# Patient Record
Sex: FEMALE | Race: OTHER | HISPANIC OR LATINO | Employment: UNEMPLOYED | URBAN - METROPOLITAN AREA
[De-identification: names, ages, dates, MRNs, and addresses within clinical notes are randomized per-mention and may not be internally consistent; named-entity substitution may affect disease eponyms.]

---

## 2023-04-17 ENCOUNTER — HOSPITAL ENCOUNTER (INPATIENT)
Facility: HOSPITAL | Age: 23
LOS: 2 days | Discharge: HOME | End: 2023-04-19
Attending: SPECIALIST | Admitting: SPECIALIST

## 2023-04-17 DIAGNOSIS — N85.8 ALTERATION IN COMFORT ASSOCIATED WITH UTERINE CONTRACTIONS: ICD-10-CM

## 2023-04-17 DIAGNOSIS — R58 HEMORRHAGE: Primary | ICD-10-CM

## 2023-04-17 DIAGNOSIS — O47.9 UTERINE CONTRACTIONS: ICD-10-CM

## 2023-04-17 LAB
ABO + RH BLD: NORMAL
ALBUMIN SERPL BCP-MCNC: 3.3 G/DL (ref 3.5–5.2)
ALP SERPL-CCNC: 321 U/L (ref 55–135)
ALT SERPL W/O P-5'-P-CCNC: 10 U/L (ref 10–44)
AMPHET+METHAMPHET UR QL: NEGATIVE
ANION GAP SERPL CALC-SCNC: 11 MMOL/L (ref 8–16)
AST SERPL-CCNC: 24 U/L (ref 10–40)
BACTERIA #/AREA URNS HPF: NEGATIVE /HPF
BARBITURATES UR QL SCN>200 NG/ML: NEGATIVE
BASOPHILS # BLD AUTO: 0.02 K/UL (ref 0–0.2)
BASOPHILS NFR BLD: 0.3 % (ref 0–1.9)
BENZODIAZ UR QL SCN>200 NG/ML: NEGATIVE
BILIRUB SERPL-MCNC: 0.5 MG/DL (ref 0.1–1)
BILIRUB UR QL STRIP: NEGATIVE
BLD GP AB SCN CELLS X3 SERPL QL: NORMAL
BUN SERPL-MCNC: 10 MG/DL (ref 6–20)
BZE UR QL SCN: NEGATIVE
CALCIUM SERPL-MCNC: 8.4 MG/DL (ref 8.7–10.5)
CANNABINOIDS UR QL SCN: NEGATIVE
CHLORIDE SERPL-SCNC: 103 MMOL/L (ref 95–110)
CLARITY UR: ABNORMAL
CO2 SERPL-SCNC: 20 MMOL/L (ref 23–29)
COLOR UR: ABNORMAL
CREAT SERPL-MCNC: 0.6 MG/DL (ref 0.5–1.4)
CREAT UR-MCNC: 24 MG/DL (ref 15–325)
DIFFERENTIAL METHOD: ABNORMAL
EOSINOPHIL # BLD AUTO: 0.1 K/UL (ref 0–0.5)
EOSINOPHIL NFR BLD: 0.9 % (ref 0–8)
ERYTHROCYTE [DISTWIDTH] IN BLOOD BY AUTOMATED COUNT: 15.1 % (ref 11.5–14.5)
EST. GFR  (NO RACE VARIABLE): >60 ML/MIN/1.73 M^2
GLUCOSE SERPL-MCNC: 86 MG/DL (ref 70–110)
GLUCOSE UR QL STRIP: NEGATIVE
HCT VFR BLD AUTO: 38.1 % (ref 37–48.5)
HGB BLD-MCNC: 12.9 G/DL (ref 12–16)
HGB UR QL STRIP: ABNORMAL
HIV1+2 IGG SERPL QL IA.RAPID: NEGATIVE
HYALINE CASTS #/AREA URNS LPF: 1 /LPF
IMM GRANULOCYTES # BLD AUTO: 0.03 K/UL (ref 0–0.04)
IMM GRANULOCYTES NFR BLD AUTO: 0.4 % (ref 0–0.5)
KETONES UR QL STRIP: ABNORMAL
LEUKOCYTE ESTERASE UR QL STRIP: NEGATIVE
LYMPHOCYTES # BLD AUTO: 1.9 K/UL (ref 1–4.8)
LYMPHOCYTES NFR BLD: 23.5 % (ref 18–48)
MCH RBC QN AUTO: 29.6 PG (ref 27–31)
MCHC RBC AUTO-ENTMCNC: 33.9 G/DL (ref 32–36)
MCV RBC AUTO: 87 FL (ref 82–98)
MICROSCOPIC COMMENT: ABNORMAL
MONOCYTES # BLD AUTO: 0.4 K/UL (ref 0.3–1)
MONOCYTES NFR BLD: 5.3 % (ref 4–15)
NEUTROPHILS # BLD AUTO: 5.5 K/UL (ref 1.8–7.7)
NEUTROPHILS NFR BLD: 69.6 % (ref 38–73)
NITRITE UR QL STRIP: NEGATIVE
NRBC BLD-RTO: 0 /100 WBC
OPIATES UR QL SCN: NEGATIVE
PCP UR QL SCN>25 NG/ML: NEGATIVE
PH UR STRIP: 8 [PH] (ref 5–8)
PLATELET # BLD AUTO: 208 K/UL (ref 150–450)
PMV BLD AUTO: 12.1 FL (ref 9.2–12.9)
POTASSIUM SERPL-SCNC: 3.5 MMOL/L (ref 3.5–5.1)
PROT SERPL-MCNC: 7 G/DL (ref 6–8.4)
PROT UR QL STRIP: ABNORMAL
RBC # BLD AUTO: 4.36 M/UL (ref 4–5.4)
RBC #/AREA URNS HPF: >100 /HPF (ref 0–4)
RPR SER QL: NORMAL
SODIUM SERPL-SCNC: 134 MMOL/L (ref 136–145)
SP GR UR STRIP: 1.01 (ref 1–1.03)
SQUAMOUS #/AREA URNS HPF: 1 /HPF
TOXICOLOGY INFORMATION: NORMAL
URN SPEC COLLECT METH UR: ABNORMAL
UROBILINOGEN UR STRIP-ACNC: NEGATIVE EU/DL
WBC # BLD AUTO: 7.86 K/UL (ref 3.9–12.7)
WBC #/AREA URNS HPF: 7 /HPF (ref 0–5)

## 2023-04-17 PROCEDURE — 80053 COMPREHEN METABOLIC PANEL: CPT | Performed by: SPECIALIST

## 2023-04-17 PROCEDURE — 86900 BLOOD TYPING SEROLOGIC ABO: CPT | Performed by: SPECIALIST

## 2023-04-17 PROCEDURE — A4216 STERILE WATER/SALINE, 10 ML: HCPCS | Performed by: SPECIALIST

## 2023-04-17 PROCEDURE — 80307 DRUG TEST PRSMV CHEM ANLYZR: CPT | Performed by: SPECIALIST

## 2023-04-17 PROCEDURE — 25000003 PHARM REV CODE 250: Performed by: SPECIALIST

## 2023-04-17 PROCEDURE — 86592 SYPHILIS TEST NON-TREP QUAL: CPT | Performed by: SPECIALIST

## 2023-04-17 PROCEDURE — 87340 HEPATITIS B SURFACE AG IA: CPT | Performed by: SPECIALIST

## 2023-04-17 PROCEDURE — 86703 HIV-1/HIV-2 1 RESULT ANTBDY: CPT | Performed by: SPECIALIST

## 2023-04-17 PROCEDURE — 87389 HIV-1 AG W/HIV-1&-2 AB AG IA: CPT | Performed by: SPECIALIST

## 2023-04-17 PROCEDURE — 72200004 HC VAGINAL DELIVERY LEVEL I

## 2023-04-17 PROCEDURE — 72200005 HC VAGINAL DELIVERY LEVEL II

## 2023-04-17 PROCEDURE — 12000002 HC ACUTE/MED SURGE SEMI-PRIVATE ROOM

## 2023-04-17 PROCEDURE — 36415 COLL VENOUS BLD VENIPUNCTURE: CPT | Performed by: SPECIALIST

## 2023-04-17 PROCEDURE — 81001 URINALYSIS AUTO W/SCOPE: CPT | Performed by: SPECIALIST

## 2023-04-17 PROCEDURE — 85025 COMPLETE CBC W/AUTO DIFF WBC: CPT | Performed by: SPECIALIST

## 2023-04-17 PROCEDURE — 63600175 PHARM REV CODE 636 W HCPCS: Performed by: SPECIALIST

## 2023-04-17 RX ORDER — PROCHLORPERAZINE EDISYLATE 5 MG/ML
5 INJECTION INTRAMUSCULAR; INTRAVENOUS EVERY 6 HOURS PRN
Status: DISCONTINUED | OUTPATIENT
Start: 2023-04-17 | End: 2023-04-17

## 2023-04-17 RX ORDER — SIMETHICONE 80 MG
1 TABLET,CHEWABLE ORAL 4 TIMES DAILY PRN
Status: DISCONTINUED | OUTPATIENT
Start: 2023-04-17 | End: 2023-04-17

## 2023-04-17 RX ORDER — CALCIUM CARBONATE 200(500)MG
500 TABLET,CHEWABLE ORAL 3 TIMES DAILY PRN
Status: DISCONTINUED | OUTPATIENT
Start: 2023-04-17 | End: 2023-04-17

## 2023-04-17 RX ORDER — OXYTOCIN-SODIUM CHLORIDE 0.9% IV SOLN 30 UNIT/500ML 30-0.9/5 UT/ML-%
30 SOLUTION INTRAVENOUS ONCE AS NEEDED
Status: DISCONTINUED | OUTPATIENT
Start: 2023-04-17 | End: 2023-04-17

## 2023-04-17 RX ORDER — METHYLERGONOVINE MALEATE 0.2 MG/ML
200 INJECTION INTRAVENOUS
Status: DISCONTINUED | OUTPATIENT
Start: 2023-04-17 | End: 2023-04-19 | Stop reason: HOSPADM

## 2023-04-17 RX ORDER — OXYTOCIN-SODIUM CHLORIDE 0.9% IV SOLN 30 UNIT/500ML 30-0.9/5 UT/ML-%
30 SOLUTION INTRAVENOUS ONCE AS NEEDED
Status: DISCONTINUED | OUTPATIENT
Start: 2023-04-17 | End: 2023-04-19 | Stop reason: HOSPADM

## 2023-04-17 RX ORDER — HYDROCORTISONE 25 MG/G
CREAM TOPICAL 3 TIMES DAILY PRN
Status: DISCONTINUED | OUTPATIENT
Start: 2023-04-17 | End: 2023-04-19 | Stop reason: HOSPADM

## 2023-04-17 RX ORDER — MUPIROCIN 20 MG/G
OINTMENT TOPICAL
Status: DISCONTINUED | OUTPATIENT
Start: 2023-04-17 | End: 2023-04-19 | Stop reason: HOSPADM

## 2023-04-17 RX ORDER — MISOPROSTOL 200 UG/1
800 TABLET ORAL ONCE AS NEEDED
Status: DISCONTINUED | OUTPATIENT
Start: 2023-04-17 | End: 2023-04-19 | Stop reason: HOSPADM

## 2023-04-17 RX ORDER — MISOPROSTOL 200 UG/1
800 TABLET ORAL
Status: DISCONTINUED | OUTPATIENT
Start: 2023-04-17 | End: 2023-04-19 | Stop reason: HOSPADM

## 2023-04-17 RX ORDER — PRENATAL WITH FERROUS FUM AND FOLIC ACID 3080; 920; 120; 400; 22; 1.84; 3; 20; 10; 1; 12; 200; 27; 25; 2 [IU]/1; [IU]/1; MG/1; [IU]/1; MG/1; MG/1; MG/1; MG/1; MG/1; MG/1; UG/1; MG/1; MG/1; MG/1; MG/1
1 TABLET ORAL DAILY
Status: DISCONTINUED | OUTPATIENT
Start: 2023-04-17 | End: 2023-04-19 | Stop reason: HOSPADM

## 2023-04-17 RX ORDER — TRANEXAMIC ACID 10 MG/ML
1000 INJECTION, SOLUTION INTRAVENOUS ONCE AS NEEDED
Status: DISCONTINUED | OUTPATIENT
Start: 2023-04-17 | End: 2023-04-17

## 2023-04-17 RX ORDER — AMOXICILLIN 250 MG
1 CAPSULE ORAL 2 TIMES DAILY PRN
Status: DISCONTINUED | OUTPATIENT
Start: 2023-04-17 | End: 2023-04-19 | Stop reason: HOSPADM

## 2023-04-17 RX ORDER — METHYLERGONOVINE MALEATE 0.2 MG/ML
200 INJECTION INTRAVENOUS
Status: DISCONTINUED | OUTPATIENT
Start: 2023-04-17 | End: 2023-04-17

## 2023-04-17 RX ORDER — CARBOPROST TROMETHAMINE 250 UG/ML
250 INJECTION, SOLUTION INTRAMUSCULAR
Status: DISCONTINUED | OUTPATIENT
Start: 2023-04-17 | End: 2023-04-19 | Stop reason: HOSPADM

## 2023-04-17 RX ORDER — TRANEXAMIC ACID 10 MG/ML
1000 INJECTION, SOLUTION INTRAVENOUS ONCE AS NEEDED
Status: DISCONTINUED | OUTPATIENT
Start: 2023-04-17 | End: 2023-04-19 | Stop reason: HOSPADM

## 2023-04-17 RX ORDER — ONDANSETRON 4 MG/1
8 TABLET, ORALLY DISINTEGRATING ORAL EVERY 8 HOURS PRN
Status: DISCONTINUED | OUTPATIENT
Start: 2023-04-17 | End: 2023-04-17

## 2023-04-17 RX ORDER — SODIUM CHLORIDE 0.9 % (FLUSH) 0.9 %
3 SYRINGE (ML) INJECTION EVERY 8 HOURS
Status: DISCONTINUED | OUTPATIENT
Start: 2023-04-17 | End: 2023-04-18

## 2023-04-17 RX ORDER — CEFAZOLIN SODIUM 2 G/50ML
2 SOLUTION INTRAVENOUS
Status: DISCONTINUED | OUTPATIENT
Start: 2023-04-17 | End: 2023-04-17

## 2023-04-17 RX ORDER — DOCUSATE SODIUM 100 MG/1
200 CAPSULE, LIQUID FILLED ORAL 2 TIMES DAILY PRN
Status: DISCONTINUED | OUTPATIENT
Start: 2023-04-17 | End: 2023-04-19 | Stop reason: HOSPADM

## 2023-04-17 RX ORDER — SIMETHICONE 80 MG
1 TABLET,CHEWABLE ORAL EVERY 6 HOURS PRN
Status: DISCONTINUED | OUTPATIENT
Start: 2023-04-17 | End: 2023-04-19 | Stop reason: HOSPADM

## 2023-04-17 RX ORDER — MISOPROSTOL 200 UG/1
800 TABLET ORAL ONCE AS NEEDED
Status: DISCONTINUED | OUTPATIENT
Start: 2023-04-17 | End: 2023-04-17

## 2023-04-17 RX ORDER — ONDANSETRON 4 MG/1
8 TABLET, ORALLY DISINTEGRATING ORAL EVERY 8 HOURS PRN
Status: DISCONTINUED | OUTPATIENT
Start: 2023-04-17 | End: 2023-04-19 | Stop reason: HOSPADM

## 2023-04-17 RX ORDER — PROCHLORPERAZINE EDISYLATE 5 MG/ML
5 INJECTION INTRAMUSCULAR; INTRAVENOUS EVERY 6 HOURS PRN
Status: DISCONTINUED | OUTPATIENT
Start: 2023-04-17 | End: 2023-04-19 | Stop reason: HOSPADM

## 2023-04-17 RX ORDER — ACETAMINOPHEN 325 MG/1
650 TABLET ORAL EVERY 6 HOURS PRN
Status: DISCONTINUED | OUTPATIENT
Start: 2023-04-17 | End: 2023-04-19 | Stop reason: HOSPADM

## 2023-04-17 RX ORDER — OXYTOCIN-SODIUM CHLORIDE 0.9% IV SOLN 30 UNIT/500ML 30-0.9/5 UT/ML-%
0-30 SOLUTION INTRAVENOUS CONTINUOUS
Status: DISCONTINUED | OUTPATIENT
Start: 2023-04-17 | End: 2023-04-17

## 2023-04-17 RX ORDER — CARBOPROST TROMETHAMINE 250 UG/ML
250 INJECTION, SOLUTION INTRAMUSCULAR
Status: DISCONTINUED | OUTPATIENT
Start: 2023-04-17 | End: 2023-04-17

## 2023-04-17 RX ORDER — SODIUM CHLORIDE, SODIUM LACTATE, POTASSIUM CHLORIDE, CALCIUM CHLORIDE 600; 310; 30; 20 MG/100ML; MG/100ML; MG/100ML; MG/100ML
INJECTION, SOLUTION INTRAVENOUS CONTINUOUS
Status: DISCONTINUED | OUTPATIENT
Start: 2023-04-17 | End: 2023-04-17

## 2023-04-17 RX ORDER — OXYTOCIN-SODIUM CHLORIDE 0.9% IV SOLN 30 UNIT/500ML 30-0.9/5 UT/ML-%
30 SOLUTION INTRAVENOUS ONCE
Status: DISCONTINUED | OUTPATIENT
Start: 2023-04-17 | End: 2023-04-19 | Stop reason: HOSPADM

## 2023-04-17 RX ORDER — OXYCODONE AND ACETAMINOPHEN 5; 325 MG/1; MG/1
1 TABLET ORAL EVERY 4 HOURS PRN
Status: DISCONTINUED | OUTPATIENT
Start: 2023-04-17 | End: 2023-04-19 | Stop reason: HOSPADM

## 2023-04-17 RX ORDER — OXYCODONE AND ACETAMINOPHEN 10; 325 MG/1; MG/1
1 TABLET ORAL EVERY 4 HOURS PRN
Status: DISCONTINUED | OUTPATIENT
Start: 2023-04-17 | End: 2023-04-19 | Stop reason: HOSPADM

## 2023-04-17 RX ORDER — LIDOCAINE HYDROCHLORIDE 10 MG/ML
10 INJECTION INFILTRATION; PERINEURAL ONCE AS NEEDED
Status: DISCONTINUED | OUTPATIENT
Start: 2023-04-17 | End: 2023-04-17

## 2023-04-17 RX ADMIN — CEFAZOLIN SODIUM 2 G: 2 SOLUTION INTRAVENOUS at 03:04

## 2023-04-17 RX ADMIN — SODIUM CHLORIDE, PRESERVATIVE FREE 3 ML: 5 INJECTION INTRAVENOUS at 02:04

## 2023-04-17 RX ADMIN — IBUPROFEN 600 MG: 400 TABLET ORAL at 02:04

## 2023-04-17 RX ADMIN — SODIUM CHLORIDE, SODIUM LACTATE, POTASSIUM CHLORIDE, AND CALCIUM CHLORIDE 1000 ML: .6; .31; .03; .02 INJECTION, SOLUTION INTRAVENOUS at 02:04

## 2023-04-17 RX ADMIN — PRENATAL VIT W/ FE FUMARATE-FA TAB 27-0.8 MG 1 TABLET: 27-0.8 TAB at 10:04

## 2023-04-17 RX ADMIN — IBUPROFEN 600 MG: 400 TABLET ORAL at 07:04

## 2023-04-17 RX ADMIN — IBUPROFEN 600 MG: 400 TABLET ORAL at 08:04

## 2023-04-17 RX ADMIN — BENZOCAINE AND LEVOMENTHOL: 200; 5 SPRAY TOPICAL at 10:04

## 2023-04-17 NOTE — SUBJECTIVE & OBJECTIVE
Obstetric HPI:  22-year-old  1 para 0 at 34 weeks and 6 days gestational age by admit ultrasound without prenatal care presents in spontaneous active labor with spontaneous rupture membranes.  Conflicting information with patient stating she did have ultrasound in [place] which gave her a due date of April 10, 2023.    Spontaneous rupture membranes at approximately 2:00 a.m. on 2023, active fetal movement    OB History    Para Term  AB Living   1 0 0 0 0 0   SAB IAB Ectopic Multiple Live Births   0 0 0 0 0      # Outcome Date GA Lbr Dustin/2nd Weight Sex Delivery Anes PTL Lv   1 Current              No past medical history on file.  No past surgical history on file.    No medications prior to admission.       Review of patient's allergies indicates:  No Known Allergies     Family History    None       Tobacco Use    Smoking status: Not on file    Smokeless tobacco: Not on file   Substance and Sexual Activity    Alcohol use: Not on file    Drug use: Not on file    Sexual activity: Not on file     Review of Systems   Objective:     Vital Signs (Most Recent):    Vital Signs (24h Range):        Weight: 49.9 kg (110 lb)  Body mass index is 20.78 kg/m².    FHT:  Baseline 130s with good variability, occasional early appearing fetal heart rate deceleration   TOCO:  Q 2-4 minutes    Physical Exam  General-uncomfortable with contractions, abdomen/uterus-gravid nontender  Extremities-no calf tenderness  Cervix:  Dilation:  6.5 cm  Effacement:  80%  Station: -2  Presentation: Vertex     Significant Labs:  No results found for: GROUPTRH, HEPBSAG, RUBELLAIGGSC, STREPBCULT, AFP, [id number]    CBC:   Recent Labs   Lab 23  0243   WBC 7.86   RBC 4.36   HGB 12.9   HCT 38.1      MCV 87   MCH 29.6   MCHC 33.9     I have personallly reviewed all pertinent lab results from the last 24 hours.

## 2023-04-17 NOTE — L&D DELIVERY NOTE
"[facility]  Vaginal Delivery   Operative Note    SUMMARY     Normal spontaneous vaginal delivery of live infant, was placed on mothers abdomen for skin to skin and bulb suctioning performed.  No shoulder dystocia.  Infant delivered position OA over perineum.  Nuchal cord: No.    Spontaneous delivery of placenta and IV pitocin given noting good uterine tone.  Uterine sweep reveals no retained products of conception.  2nd degree laceration noted and repaired in normal fashion with 2-0 chromic, rectovaginal exam within normal limits. .  Patient tolerated delivery well. Sponge needle and lap counted correctly x2.    Indications: Alteration in comfort associated with uterine contractions  Pregnancy complicated by:   Patient Active Problem List   Diagnosis    Alteration in comfort associated with uterine contractions     Admitting GA: 34w6d    Viable female infant with Apgar scores 8/9, weight 5 lb 7 oz     cc    Delivery Information for Girl Glory Myers    Birth information:  YOB: 2021   Time of birth: 5:02 AM   Sex: female   Head Delivery Date/Time: 2023  5:02 AM   Delivery type: Vaginal, Spontaneous   Gestational Age: 34w6d    Delivery Providers    Delivering clinician: Ronald Martínez MD   Provider Role    Yasmin Paige, RN Delivery Nurse    Loc Aleman, CST Scrub Person    Katja Velez, MARTIN Delivery Assist    Jennyfer Strong, MARTIN Delivery Assist    Florencia Talamantes, MARTIN Delivery Assist              Measurements    Weight: 2479 g  Weight (lbs): 5 lb 7.4 oz  Length: 47 cm  Length (in): 18.5"         Apgars    Living status: Living  Apgars:  1 min.:  5 min.:  10 min.:  15 min.:  20 min.:    Skin color:  0  1       Heart rate:  2  2       Reflex irritability:  2  2       Muscle tone:  2  2       Respiratory effort:  2  2       Total:  8  9       Apgars assigned by: ASHLEY SALAZAR         Operative Delivery    Forceps attempted?: No  Vacuum extractor attempted?: No   "       Shoulder Dystocia    Shoulder dystocia present?: No           Presentation    Presentation: Vertex  Position: Left Occiput Anterior           Interventions/Resuscitation    Method: Bulb Suctioning, Tactile Stimulation       Cord    Vessels: 3 vessels  Complications: None  Delayed Cord Clamping?: Yes  Cord Clamped Date/Time: 2023  5:03 AM  Cord Blood Disposition: Sent with Baby  Gases Sent?: No       Placenta    Placenta delivery date/time: 2023 0504  Placenta removal: Spontaneous  Placenta appearance: Intact  Placenta disposition: Discarded           Labor Events:       labor: Yes     Labor Onset Date/Time: 2023 02:00     Dilation Complete Date/Time: 2023 04:45     Start Pushing Date/Time: 2023 05:03       Start Pushing Date/Time: 2023 05:03     Rupture Date/Time: 23  0200           Rupture type: SRM (Spontaneous Rupture)           Fluid Amount:         Fluid Color: Clear                 steroids: None     Antibiotics given for GBS: Yes     Induction:       Indications for induction:        Augmentation:       Indications for augmentation:       Labor complications: None     Additional complications:          Cervical ripening:                     Delivery:      Episiotomy:       Indication for Episiotomy:       Perineal Lacerations:   Repaired:      Periurethral Laceration:   Repaired:     Labial Laceration:   Repaired:     Sulcus Laceration:   Repaired:     Vaginal Laceration:   Repaired:     Cervical Laceration:   Repaired:     Repair suture:       Repair # of packets:       Last Value - EBL - Nursing (mL):       Sum - EBL - Nursing (mL): 0     Last Value - EBL - Anesthesia (mL):        Calculated QBL (mL):         Vaginal Sweep Performed:       Surgicount Correct:         Other providers:       Anesthesia    Method: Local          Details (if applicable):  Trial of Labor      Categorization:      Priority:     Indications  for :     Incision Type:       Additional  information:  Forceps:    Vacuum:    Breech:    Observed anomalies    Other (Comments):

## 2023-04-17 NOTE — NURSING TRANSFER
Nursing Transfer Note      4/17/2023     Reason patient is being transferred: delivered    Transfer To: 2106    Transfer via wheelchair    Transfer with belongings    Transported by Meghan Lomas RNC    Medicines sent: none    Any special needs or follow-up needed: none    Chart send with patient: Yes    Notified: spouse    Patient reassessed at: 4/17/23 0815    Upon arrival to floor: patient oriented to room, call bell in reach, and bed in lowest position    Report to Marycruz Killian RN

## 2023-04-17 NOTE — ASSESSMENT & PLAN NOTE
IUP at 34 weeks and 6 days gestational age by admit ultrasound, possibly 41 weeks  [place], last visit with OB in [place] in January 2023   Spontaneous labor with spontaneous rupture membranes-making cervical change   Unknown GBS status-on IV antibiotics   Drop-in labs   Patient declines epidural or IV pain medication  Ugandan-speaking only

## 2023-04-17 NOTE — H&P
[facility]  Obstetrics  History & Physical    Patient Name: Glory Myers  MRN: [id number]  Admission Date: 2023  Primary Care Provider: No primary care provider on file.    Subjective:     Principal Problem:Alteration in comfort associated with uterine contractions    History of Present Illness:  No notes on file    Obstetric HPI:  22-year-old  1 para 0 at 34 weeks and 6 days gestational age by admit ultrasound without prenatal care presents in spontaneous active labor with spontaneous rupture membranes.  Conflicting information with patient stating she did have ultrasound in [place] which gave her a due date of April 10, 2023.    Spontaneous rupture membranes at approximately 2:00 a.m. on 2023, active fetal movement    OB History    Para Term  AB Living   1 0 0 0 0 0   SAB IAB Ectopic Multiple Live Births   0 0 0 0 0      # Outcome Date GA Lbr Dustin/2nd Weight Sex Delivery Anes PTL Lv   1 Current              No past medical history on file.  No past surgical history on file.    No medications prior to admission.       Review of patient's allergies indicates:  No Known Allergies     Family History    None       Tobacco Use    Smoking status: Not on file    Smokeless tobacco: Not on file   Substance and Sexual Activity    Alcohol use: Not on file    Drug use: Not on file    Sexual activity: Not on file     Review of Systems   Objective:     Vital Signs (Most Recent):    Vital Signs (24h Range):        Weight: 49.9 kg (110 lb)  Body mass index is 20.78 kg/m².    FHT:  Baseline 130s with good variability, occasional early appearing fetal heart rate deceleration   TOCO:  Q 2-4 minutes    Physical Exam  General-uncomfortable with contractions, abdomen/uterus-gravid nontender  Extremities-no calf tenderness  Cervix:  Dilation:  6.5 cm  Effacement:  80%  Station: -2  Presentation: Vertex     Significant Labs:  No results found for: GROUPTRH, HEPBSAG, RUBELLAIGGSC,  STREPBCULT, AFP, [id number]    CBC:   Recent Labs   Lab 23  0243   WBC 7.86   RBC 4.36   HGB 12.9   HCT 38.1      MCV 87   MCH 29.6   MCHC 33.9     I have personallly reviewed all pertinent lab results from the last 24 hours.    Assessment/Plan:     22 y.o. female  at 34w6d for:    * Alteration in comfort associated with uterine contractions  IUP at 34 weeks and 6 days gestational age by admit ultrasound, possibly 41 weeks  [place], last visit with OB in [place] in 2023   Spontaneous labor with spontaneous rupture membranes-making cervical change   Unknown GBS status-on IV antibiotics   Drop-in labs   Patient declines epidural or IV pain medication  South [place]ese-speaking only        Gege Martínez MD  Obstetrics  [facility]

## 2023-04-17 NOTE — CARE UPDATE
GREG asked Rebecca (Medicaid Representative) to review patient for coverage.    SW sent text to [facility] to inform of need, SW awaiting response. (Typically, they assist with cribs,car seats,bottles,diapers, etc).

## 2023-04-17 NOTE — HOSPITAL COURSE
22-year-old  1 para 0 at 34 weeks and 6 days gestational age by admit ultrasound without prenatal care presents in spontaneous active labor with spontaneous rupture membranes.  Conflicting information with patient stating she did have ultrasound in [place] which gave her a due date of April 10, 2023.

## 2023-04-17 NOTE — LACTATION NOTE
This note was copied from a baby's chart.     04/17/23 6110   Maternal Assessment   Breast Size Issue none   Breast Shape round   Breast Density soft   Areola elastic   Nipples everted   Maternal Infant Feeding   Maternal Emotional State assist needed   Infant Positioning side-lying   Signs of Milk Transfer audible swallow   Pain with Feeding no   Latch Assistance yes     Sami speaking only. Michelle interpretor [place] [id number]. Assisted with position & latch. Baby latched on well but has uncoordinated suck & swallow. Discussed feeding cues & feeding frequency 8 or more times in 24 hours. Instructed mom to always offer breast 1st prior to supplementing with formula. Discussed supply & demand. Sami booklet reviewed with mom. Assistance offered prn. Mom verbalized understanding

## 2023-04-18 LAB
BASOPHILS # BLD AUTO: 0.02 K/UL (ref 0–0.2)
BASOPHILS NFR BLD: 0.3 % (ref 0–1.9)
DIFFERENTIAL METHOD: ABNORMAL
EOSINOPHIL # BLD AUTO: 0.1 K/UL (ref 0–0.5)
EOSINOPHIL NFR BLD: 0.9 % (ref 0–8)
ERYTHROCYTE [DISTWIDTH] IN BLOOD BY AUTOMATED COUNT: 15.4 % (ref 11.5–14.5)
HBV SURFACE AG SERPL QL IA: NEGATIVE
HCT VFR BLD AUTO: 32.9 % (ref 37–48.5)
HGB BLD-MCNC: 10.9 G/DL (ref 12–16)
HIV 1+2 AB+HIV1 P24 AG SERPL QL IA: NON REACTIVE
IMM GRANULOCYTES # BLD AUTO: 0.02 K/UL (ref 0–0.04)
IMM GRANULOCYTES NFR BLD AUTO: 0.3 % (ref 0–0.5)
LYMPHOCYTES # BLD AUTO: 2.4 K/UL (ref 1–4.8)
LYMPHOCYTES NFR BLD: 30.3 % (ref 18–48)
MCH RBC QN AUTO: 29.8 PG (ref 27–31)
MCHC RBC AUTO-ENTMCNC: 33.1 G/DL (ref 32–36)
MCV RBC AUTO: 90 FL (ref 82–98)
MONOCYTES # BLD AUTO: 0.5 K/UL (ref 0.3–1)
MONOCYTES NFR BLD: 6.6 % (ref 4–15)
NEUTROPHILS # BLD AUTO: 4.9 K/UL (ref 1.8–7.7)
NEUTROPHILS NFR BLD: 61.6 % (ref 38–73)
NRBC BLD-RTO: 0 /100 WBC
PLATELET # BLD AUTO: 173 K/UL (ref 150–450)
PMV BLD AUTO: 11.6 FL (ref 9.2–12.9)
RBC # BLD AUTO: 3.66 M/UL (ref 4–5.4)
WBC # BLD AUTO: 7.93 K/UL (ref 3.9–12.7)

## 2023-04-18 PROCEDURE — 36415 COLL VENOUS BLD VENIPUNCTURE: CPT | Performed by: SPECIALIST

## 2023-04-18 PROCEDURE — 12000002 HC ACUTE/MED SURGE SEMI-PRIVATE ROOM

## 2023-04-18 PROCEDURE — 85025 COMPLETE CBC W/AUTO DIFF WBC: CPT | Performed by: SPECIALIST

## 2023-04-18 PROCEDURE — 25000003 PHARM REV CODE 250: Performed by: SPECIALIST

## 2023-04-18 RX ADMIN — OXYCODONE HYDROCHLORIDE AND ACETAMINOPHEN 1 TABLET: 10; 325 TABLET ORAL at 07:04

## 2023-04-18 RX ADMIN — PRENATAL VIT W/ FE FUMARATE-FA TAB 27-0.8 MG 1 TABLET: 27-0.8 TAB at 08:04

## 2023-04-18 RX ADMIN — OXYCODONE HYDROCHLORIDE AND ACETAMINOPHEN 1 TABLET: 10; 325 TABLET ORAL at 11:04

## 2023-04-18 RX ADMIN — IBUPROFEN 600 MG: 400 TABLET ORAL at 07:04

## 2023-04-18 NOTE — NURSING
Kellen  used for morning assessment. ID [id number]. Nurse addressed concerns of needing a car seat for baby to complete the car seat challenge before discharge. Patients spouse at bedside explained that his aunt will bring it tomorrow. Nurse also explained that we will need pediatrician information to complete the PKU form. Spouse explained that his aunt will send the information. Nurse explained to call on the call light when they receive information. Both patient and spouse verbalized understanding. Patient educated on safe sleep with baby. Nurse explained that baby should be on her back in the crib with only a swaddle blanket and no thick fluffy blanket. Patient verbalized understanding. No questions or concerns at this time. Nurse helped latch baby on right breast before leaving the room. Patient instructed to call on call light for any assistance. Patient and spouse verbalized understanding.

## 2023-04-18 NOTE — CARE UPDATE
Rebecca (Medicaid Rep) informed SW that she made attempt to contact pt by number on file and pt phone in room, no answer. Rebecca suggested SW provide pt with FAA, sw explained pt may not understand form or how to fill form out. GREG also explained that number on file is patient aunt number. Rebecca will make another attempt to contact patient by hospital phone.

## 2023-04-18 NOTE — SUBJECTIVE & OBJECTIVE
Interval History:  Postpartum day 1.-no complaints    She is doing well this morning. She is tolerating a regular diet without nausea or vomiting. She is voiding spontaneously. She is ambulating. She has passed flatus, and has not a BM. Vaginal bleeding is mild. She denies fever or chills. Abdominal pain is mild and controlled with oral medications.    Objective:     Vital Signs (Most Recent):  Temp: 98.5 °F (36.9 °C) (04/18/23 1109)  Pulse: 64 (04/18/23 1109)  Resp: 17 (04/18/23 1109)  BP: (!) 110/59 (04/18/23 1109)  SpO2: 98 % (04/18/23 1109)   Vital Signs (24h Range):  Temp:  [98.1 °F (36.7 °C)-98.5 °F (36.9 °C)] 98.5 °F (36.9 °C)  Pulse:  [47-64] 64  Resp:  [16-18] 17  SpO2:  [97 %-99 %] 98 %  BP: (108-121)/(59-74) 110/59     Weight: 49.9 kg (110 lb)  Body mass index is 20.78 kg/m².    No intake or output data in the 24 hours ending 04/18/23 1242      Significant Labs:  Lab Results   Component Value Date    GROUPTRH O POS 04/17/2023    HEPBSAG Negative 04/17/2023     Recent Labs   Lab 04/18/23  0505   HGB 10.9*   HCT 32.9*       CBC:   Recent Labs   Lab 04/18/23  0505   WBC 7.93   RBC 3.66*   HGB 10.9*   HCT 32.9*      MCV 90   MCH 29.8   MCHC 33.1     I have personallly reviewed all pertinent lab results from the last 24 hours.    Physical Exam  General-no apparent distress resting comfortably in bed   Uterus-firm below the umbilicus  Lochia-minimal  Extremities-no calf tenderness  Review of Systems

## 2023-04-18 NOTE — NURSING
Call light answered by nurse. Justin  used at bedside. Spouse showed nurse phone the aunt in [place] ordered bus tickets from the [employer] station in [place] to [facility] for 8 am.     Nurse contacted both OB and Peds    OB gave ok to discharge patient in the AM     Peds ordered a weight and TCB on baby in the morning nurse to report results to Ped MD to consider order at 7am.     House sup called to get cab in order to bring family to bus station per . Edmar sup did not answer. Will continue to call to have arrangements for AM.

## 2023-04-18 NOTE — PLAN OF CARE
No acute events over night. AWA used to complete patient assessment. VSS. Bleeding is light. Patient medicated according to pain level. Pain controlled with oral pain medication. Patient and significant other educated on the importance of safe sleep with infant using . Patient and significant other verbalized understanding. Patient denies any questions or concerns at this time.      Problem: Adult Inpatient Plan of Care  Goal: Optimal Comfort and Wellbeing  Outcome: Ongoing, Progressing     Problem:  Fall Injury Risk  Goal: Absence of Fall, Infant Drop and Related Injury  Outcome: Ongoing, Progressing     Problem: Infection  Goal: Absence of Infection Signs and Symptoms  Outcome: Ongoing, Progressing     Problem: Adult Inpatient Plan of Care  Goal: Patient-Specific Goal (Individualized)  Outcome: Ongoing, Progressing

## 2023-04-18 NOTE — PLAN OF CARE
JULIA [id number]  Assessment completed: at bedside with mother,  father also present     Address mother and baby will discharge home to: [place] address on file to live with aunt     History of Substance Abuse issues: Mother denies     Assistive Treatment Programs or Medications: mother denies     History of Mental Health issues: mother denies     History of Domestic Violence: mother denies     Name: Tika Coellomez    GREG conducted a full assessment at bedside with mother due to a consult request for drop in from [place] and no prenatal care. SW asked if there was a reason mother had no prenatal care, mother explained she was waiting in a migrant [place] for about 3 months in order to get to the [place] SW asked mother the plan to get baby to and from medical appointments, both parents stated their aunt. SW asked will aunt come to hospital, father stated yes. There is no pediatrician information available, parents are waiting on aunt to arrive from [place] with that information. Parents do not have any items for baby (car seat, crib, clothes, bottles, or diapers.) SW coordinated with [facility] on 23, who will assist with items. SW educated mother and father on WIC and provided them with a [facility] brochure, translated into French. Family support is the aunt. SW asked mother if she had any questions or concerns, mother stated no. SW asked mother if she had any resource needs, mother stated she has everything and there is no need for resource. Mother has no further needs at this time. White board in room updated with contact information, and mother was encouraged to contact office if further needs arise.       23 1033   OB Discharge Planning Assessment   Assessment Type Discharge Planning Assessment   Source of Information patient;health record; utilized   Verified Demographic and Insurance Information   (sent secure chat to Rebecca for review)   People in Home other  relative(s);significant other   Name(s) of People in Home Ana Gonzalez (aunt) [phone], Joseph Yusuf (significant other)   Number people in home 3 including baby   Relationship Status In relationship   Name of Support/Comfort Primary Source Ana Gonzalez (aunt) [phone]   Currently Enrolled in School No   Father's Involvement Fully Involved   Is Father signing the birth certificate Yes   Father's Address Joseph Yusuf same address as mom   Father's Employer father plans to begin work once in [place]   Family Involvement Minimal  (aunt only form of support)   Received Prenatal Care No  (Pt stated she and s/o were in migrant camps and could not recieve prenatal care)   Transportation Anticipated family or friend will provide   Receive WIC Benefits Not certified, will apply for     Arrangements Self;Family   Adoption Planned no   Infant Feeding Plan breastfeeding;formula feeding   Previous Breastfeeding Experience no   Breast Pump Needed no   Does baby have crib or safe sleep space? No   Plans to obtain crib by discharge Plans to obtain by discharge   Do you have a car seat? No   Provided resources to obtain Provided resources to obtain   Has other essential care items? None   Pediatrician aunt selected a pediatrician and pt nor s/o has that information. Aunt is to provide information later today or tomorrow upon arrival   Resource/Environmental Concerns none   Equipment Currently Used at Home none   Potential Discharge Needs None   Resources/Education Provided WIC   DME Needed Upon Discharge  none   DCFS No indications (Indicators for Report)   Discharge Plan A Home;Home with family   Discharge Plan B Home;Home with family   Do you have any problems affording any of your prescribed medications? No

## 2023-04-18 NOTE — CARE UPDATE
SW met at bedside with bassinet, car seat, and other items from crisis center. SW utilized [employer] to ask parents about aunt because she was not answering the phone. SW asked if parents can call aunt, father called a number that is disconnected. (The number that was provided to SW Is not a disconnected line). Parents stated aunt is at work at that is why she is not answering. SW asked parents if aunt will pick them up tomorrow morning, parents stated yes. SW informed parents that she will come back shortly so that they can try aunt again.     During the conversation, SW noticed baby under cover in bed with mom. SW educated parents on safe sleep. Mother stated baby is breast feeding, SW asked for cover to be removed from over baby head. SW also asked that baby is placed in crib once finish feeding. SW informed assigned RN.     SW informed parents that medicaid representative have been trying to contact her to provide health care coverage, mother stated she is trying to get in contact with a family member who can understand representative (even though representative is using a  service).     GREG coordinated with  Director about discharge plan for family if aunt does not arrive tomorrow. Director informed SW to search for shelters in [place] and [facility] can provide a taxi there.

## 2023-04-18 NOTE — PROGRESS NOTES
[facility]  Obstetrics  Postpartum Progress Note    Patient Name: Glory Ding  MRN: [id number]  Admission Date: 2023  Hospital Length of Stay: 1 days  Attending Physician: Ronald Martínez MD  Primary Care Provider: Primary Doctor No    Subjective:     Principal Problem:Alteration in comfort associated with uterine contractions    Hospital Course:  22-year-old  1 para 0 at 34 weeks and 6 days gestational age by admit ultrasound without prenatal care presents in spontaneous active labor with spontaneous rupture membranes.  Conflicting information with patient stating she did have ultrasound in [place] which gave her a due date of April 10, 2023.      Interval History:  Postpartum day 1.-no complaints    She is doing well this morning. She is tolerating a regular diet without nausea or vomiting. She is voiding spontaneously. She is ambulating. She has passed flatus, and has not a BM. Vaginal bleeding is mild. She denies fever or chills. Abdominal pain is mild and controlled with oral medications.    Objective:     Vital Signs (Most Recent):  Temp: 98.5 °F (36.9 °C) (23 1109)  Pulse: 64 (23 1109)  Resp: 17 (23 1109)  BP: (!) 110/59 (23 1109)  SpO2: 98 % (23 1109)   Vital Signs (24h Range):  Temp:  [98.1 °F (36.7 °C)-98.5 °F (36.9 °C)] 98.5 °F (36.9 °C)  Pulse:  [47-64] 64  Resp:  [16-18] 17  SpO2:  [97 %-99 %] 98 %  BP: (108-121)/(59-74) 110/59     Weight: 49.9 kg (110 lb)  Body mass index is 20.78 kg/m².    No intake or output data in the 24 hours ending 23 1242      Significant Labs:  Lab Results   Component Value Date    GROUPTRH O POS 2023    HEPBSAG Negative 2023     Recent Labs   Lab 23  0505   HGB 10.9*   HCT 32.9*       CBC:   Recent Labs   Lab 23  0505   WBC 7.93   RBC 3.66*   HGB 10.9*   HCT 32.9*      MCV 90   MCH 29.8   MCHC 33.1     I have personallly reviewed all pertinent lab results from the last 24  hours.    Physical Exam  General-no apparent distress resting comfortably in bed   Uterus-firm below the umbilicus  Lochia-minimal  Extremities-no calf tenderness  Review of Systems    Assessment/Plan:     22 y.o. female  for:    * Alteration in comfort associated with uterine contractions  Ppd 1.-status post -doing well   discharge tomorrow        Disposition: As patient meets milestones, will plan to discharge .    Gege Martínez MD  Obstetrics  [facility]

## 2023-04-18 NOTE — LACTATION NOTE
Spoke to parents via language line interpretor # [id number]. Patient lying in bed with baby , baby's body and face completely covered with blanket. Removed blanket and advised patient, via interpretor, not to cover baby's face and to place baby in crib on her back when sleeping. Patient states that baby is breastfeeding and then supplementing with formula. States that baby last fed at 1100 for 3 minutes on left breast, then took 50 ml of formula. States that nipples hurt during breastfeeding. Reviewed basic breastfeeding instructions and emphasized importance of deep latch and putting baby to breast at least 8 times in 24 hours to stimulate adequate milk production. Instructed to put baby to breast and stimulate during feeding to keep baby nursing for longer periods, before offering formula. Notified that I will be back to assist with next feeding and to call nurses station if lactation assistance needed in the meantime. Denies any breastfeeding related questions or concerns and verbalizes understanding of all instructions with good recall, via interpretor.

## 2023-04-18 NOTE — CARE UPDATE
SW and RN met at bedside with patient. After RN discussed  with baby. SW asked parents if they had talked with their aunt. Father stated yes, aunt car is now damaged and she cannot get here until Friday. Mother and baby will be ready to discharge tomorrow. SW asked if aunt can purchase bus tickets to [place]. Father called aunt who stated she will purchase tickets. SW explained to parents that if aunt cannot purchase tickets, the hospital will purchase a cab to get family to homeless shelter. SW made it clear that it is possible father will be  from mother due to most homeless shelters not allowing both genders. SW asked parents to notify RN once tickets are purchased.    [facility] (has a men side and a women side)  Address: [address] [place], [place] [address] [place] [address]  Hours:   Open 24 hours  Phone: [phone]    [facility] [place] [address]  P [phone]  F [phone]

## 2023-04-19 VITALS
SYSTOLIC BLOOD PRESSURE: 107 MMHG | OXYGEN SATURATION: 100 % | BODY MASS INDEX: 20.77 KG/M2 | DIASTOLIC BLOOD PRESSURE: 66 MMHG | WEIGHT: 110 LBS | HEIGHT: 61 IN | HEART RATE: 71 BPM | RESPIRATION RATE: 18 BRPM | TEMPERATURE: 98 F

## 2023-04-19 PROCEDURE — 25000003 PHARM REV CODE 250: Performed by: SPECIALIST

## 2023-04-19 RX ADMIN — OXYCODONE HYDROCHLORIDE AND ACETAMINOPHEN 1 TABLET: 10; 325 TABLET ORAL at 03:04

## 2023-04-19 RX ADMIN — IBUPROFEN 600 MG: 400 TABLET ORAL at 03:04

## 2023-04-19 NOTE — CARE UPDATE
JULIA utilized to complete discharge education and instructions. Follow up appointment instructions discussed, patient verbalized understanding. All questions answered at this time. Patient given [facility] hospital phone number to call with any questions or concerns. Patient provided with printed copy of discharge information.

## 2023-04-19 NOTE — NURSING
Call placed to the nursing supervisor about making arrangements for taxi service at patient discharge. Mary provided phone number to [employer] cab service to make arrangements for early transport. I called and spoke with dispatch and was instructed to call back at 0530 to speak to someone about transportation services.

## 2023-04-19 NOTE — CARE UPDATE
Patient in no apparent distress. VSS. Ambulating without difficulty. No needs at this time. Discharge papers signed. Mother Baby care guide reviewed. All questions answered. Escorted to front entrance of hospital, cab picked up patient and family for escort to bus station. Mom d/c in stable condition with even and unlabored respirations.

## 2023-04-19 NOTE — DISCHARGE INSTRUCTIONS
"Hacer un seguimiento con tomlinson médico en 2 semanas o antes para cualquier problema.    [facility] nithya 6 semanas (sin sexo, tampones, douching, nada en la vagina)   Usted puede experimentar sangrado vaginal dentro y fuera de hasta 6 semanas, poco a poco se volverá más neema y el color cambiará de grijalva brillante a torrie secreción marrón hacia el final.  Actividad:   NO hay actividades extenuantes o hacer ejercicio nithya 6 semanas. No recoja/levante nada de más de 15 libras y no haya tareas domésticas pesadas o limpieza nithya 6 semanas. Limite la escalada de escaleras a dos veces al día nithya las primeras 2 semanas.   NO conducir nithya 4 semanas. Puede hacer viajes cortos en coche, anastasia no conducir.   Usted puede ducharse SOLAMENTE nithya las primeras 2 semanas, después de 2 semanas puede empaparse en torrie bañera. Use un jabón suave, sin perfumes pesados ni fragancias para evitar la irritación.   Caminar con frecuencia después de un parto por cesárea promueve la curación y disminuye el dolor asociado con el gas.   Si se desarrolla estreñimiento: Puede rishabh Colace (ablandador de heces), Leche de Magnesia, Dulcolax o Miralax. Todos estos medicamentos se venden sin receta.   Cuidado de la incisión:]  Ha le mostró la enfermera de cuidado de heridas, quítese el apósito y retire el embalaje. Entonces usted puede ducharse y tomlinson otro significativo puede empacar y reparar la herida. hacer esto diariamente hasta que el Dr. Diaz le indique lo contrario.    Alivio del dolor:   Puede rishabh Motrin para el dolor leve y los calambres uterinos.      Cambios emocionales:   Usted puede experimentar "baby blues" después del parto. Usted puede sentirse defraudado, ansioso y llorar fácilmente. [place] es normal. Estos sentimientos pueden comenzar 2-3 días después del parto y por lo general desaparecen en aproximadamente torrie o dos semanas. La tristeza prolongada puede indicar depresión posparto.      Llame a tomlinson médico para " cualquiera de los siguientes:   Dificultad para respirar, problemas con cualquiera de kelvin medicamentos, incapacidad para comer.   Secreción vaginal con olor fétido.   Si notas un drenaje similar al pus de la incisión, si la incisión o el área alrededor de joao se calienta o se hincha, o si notas un olor fétido que huele mal.   Temperatura por encima de 100.4.   Sangrado vaginal intenso. Todas las mujeres sangran diferentes después del parto y cada parto es diferente. El sangrado intenso consiste en saturar torrie almohadilla luisa en un período de tiempo de 1 hora. Los coágulos que pasan son normales, si pasas un coágulo de christiano mayor que el tamaño de torrie pelota de golf, llama al consultorio de tu médico.   Si experimenta dolor en las piernas/terneros, si torrie pierna aumenta de tamaño y se hincha o se calienta al tacto o se decolora.   Llorar o períodos de tristeza más allá de 2 semanas.    Si está amamantando:   Lávese los senos con jabón suave y agua tibia.   Deberías usar un sostén de apoyo.   Debe continuar tomando torrie vitamina prenatal nithya 6 semanas o hasta que se interrumpa la lactancia materna.   Si los pezones están doloridos, aplica unas gotas de leche materna después de la lactancia y sylvain que se seque al aire o puedes usar crema de Lanolin.   Si los senos están engordados, aplique calor y exprese la leche.   El consultor de lactancia [facility] está disponible al [phone] para kelvin necesidades de lactancia materna.     Si no está amamantando:   Use un sostén apretado y no estimule kelvin senos. Evite manipular kelvin senos y no exprese leche. Usted puede aplicar compresas de hielo o hojas de repollo para aliviar las molestias del engorgement. Si los senos se calientan al tacto, se enredan o se desarrollan bultos, llame al médico.